# Patient Record
Sex: MALE | Race: BLACK OR AFRICAN AMERICAN | NOT HISPANIC OR LATINO | Employment: OTHER | URBAN - METROPOLITAN AREA
[De-identification: names, ages, dates, MRNs, and addresses within clinical notes are randomized per-mention and may not be internally consistent; named-entity substitution may affect disease eponyms.]

---

## 2017-05-26 PROBLEM — I10 ESSENTIAL HYPERTENSION: Status: ACTIVE | Noted: 2017-05-26

## 2017-07-19 PROBLEM — K02.9 DENTAL CARIES: Status: ACTIVE | Noted: 2017-07-19

## 2018-02-11 ENCOUNTER — HOSPITAL ENCOUNTER (EMERGENCY)
Facility: HOSPITAL | Age: 43
Discharge: HOME | End: 2018-02-12
Attending: EMERGENCY MEDICINE
Payer: MEDICAID

## 2018-02-11 DIAGNOSIS — R10.10 PAIN OF UPPER ABDOMEN: ICD-10-CM

## 2018-02-11 DIAGNOSIS — K29.70 GASTRITIS WITHOUT BLEEDING, UNSPECIFIED CHRONICITY, UNSPECIFIED GASTRITIS TYPE: Primary | ICD-10-CM

## 2018-02-11 PROCEDURE — 81003 URINALYSIS AUTO W/O SCOPE: CPT

## 2018-02-11 PROCEDURE — 99284 EMERGENCY DEPT VISIT MOD MDM: CPT | Mod: 25

## 2018-02-11 PROCEDURE — 85025 COMPLETE CBC W/AUTO DIFF WBC: CPT

## 2018-02-11 PROCEDURE — 80053 COMPREHEN METABOLIC PANEL: CPT

## 2018-02-11 RX ORDER — FAMOTIDINE 10 MG/ML
20 INJECTION INTRAVENOUS
Status: COMPLETED | OUTPATIENT
Start: 2018-02-12 | End: 2018-02-12

## 2018-02-11 RX ORDER — FAMOTIDINE 10 MG/ML
20 INJECTION INTRAVENOUS 2 TIMES DAILY
Status: DISCONTINUED | OUTPATIENT
Start: 2018-02-12 | End: 2018-02-11

## 2018-02-11 RX ORDER — ONDANSETRON 2 MG/ML
4 INJECTION INTRAMUSCULAR; INTRAVENOUS
Status: COMPLETED | OUTPATIENT
Start: 2018-02-12 | End: 2018-02-12

## 2018-02-12 VITALS
HEIGHT: 73 IN | TEMPERATURE: 99 F | RESPIRATION RATE: 20 BRPM | WEIGHT: 315 LBS | BODY MASS INDEX: 41.75 KG/M2 | HEART RATE: 84 BPM | OXYGEN SATURATION: 100 % | SYSTOLIC BLOOD PRESSURE: 142 MMHG | DIASTOLIC BLOOD PRESSURE: 86 MMHG

## 2018-02-12 LAB
ALBUMIN SERPL BCP-MCNC: 3.6 G/DL
ALP SERPL-CCNC: 74 U/L
ALT SERPL W/O P-5'-P-CCNC: 14 U/L
ANION GAP SERPL CALC-SCNC: 12 MMOL/L
AST SERPL-CCNC: 18 U/L
BASOPHILS # BLD AUTO: 0.01 K/UL
BASOPHILS NFR BLD: 0.1 %
BILIRUB SERPL-MCNC: 1.1 MG/DL
BILIRUB UR QL STRIP: NEGATIVE
BUN SERPL-MCNC: 8 MG/DL
CALCIUM SERPL-MCNC: 9.5 MG/DL
CHLORIDE SERPL-SCNC: 104 MMOL/L
CLARITY UR: CLEAR
CO2 SERPL-SCNC: 23 MMOL/L
COLOR UR: YELLOW
CREAT SERPL-MCNC: 1.3 MG/DL
DIFFERENTIAL METHOD: ABNORMAL
EOSINOPHIL # BLD AUTO: 0.2 K/UL
EOSINOPHIL NFR BLD: 2.4 %
ERYTHROCYTE [DISTWIDTH] IN BLOOD BY AUTOMATED COUNT: 14 %
EST. GFR  (AFRICAN AMERICAN): >60 ML/MIN/1.73 M^2
EST. GFR  (NON AFRICAN AMERICAN): >60 ML/MIN/1.73 M^2
GLUCOSE SERPL-MCNC: 121 MG/DL
GLUCOSE UR QL STRIP: NEGATIVE
HCT VFR BLD AUTO: 45.8 %
HGB BLD-MCNC: 15 G/DL
HGB UR QL STRIP: NEGATIVE
KETONES UR QL STRIP: NEGATIVE
LEUKOCYTE ESTERASE UR QL STRIP: NEGATIVE
LYMPHOCYTES # BLD AUTO: 2.4 K/UL
LYMPHOCYTES NFR BLD: 27.9 %
MCH RBC QN AUTO: 29.5 PG
MCHC RBC AUTO-ENTMCNC: 32.8 G/DL
MCV RBC AUTO: 90 FL
MONOCYTES # BLD AUTO: 1.1 K/UL
MONOCYTES NFR BLD: 13.3 %
NEUTROPHILS # BLD AUTO: 4.7 K/UL
NEUTROPHILS NFR BLD: 56.1 %
NITRITE UR QL STRIP: NEGATIVE
PH UR STRIP: 6 [PH] (ref 5–8)
PLATELET # BLD AUTO: 218 K/UL
PMV BLD AUTO: 11.2 FL
POTASSIUM SERPL-SCNC: 3.7 MMOL/L
PROT SERPL-MCNC: 7.3 G/DL
PROT UR QL STRIP: NEGATIVE
RBC # BLD AUTO: 5.08 M/UL
SODIUM SERPL-SCNC: 139 MMOL/L
SP GR UR STRIP: <=1.005 (ref 1–1.03)
URN SPEC COLLECT METH UR: ABNORMAL
UROBILINOGEN UR STRIP-ACNC: NEGATIVE EU/DL
WBC # BLD AUTO: 8.43 K/UL

## 2018-02-12 PROCEDURE — S0028 INJECTION, FAMOTIDINE, 20 MG: HCPCS | Performed by: EMERGENCY MEDICINE

## 2018-02-12 PROCEDURE — 25500020 PHARM REV CODE 255: Performed by: EMERGENCY MEDICINE

## 2018-02-12 PROCEDURE — 96361 HYDRATE IV INFUSION ADD-ON: CPT

## 2018-02-12 PROCEDURE — 96375 TX/PRO/DX INJ NEW DRUG ADDON: CPT

## 2018-02-12 PROCEDURE — 25000003 PHARM REV CODE 250: Performed by: EMERGENCY MEDICINE

## 2018-02-12 PROCEDURE — 63600175 PHARM REV CODE 636 W HCPCS: Performed by: EMERGENCY MEDICINE

## 2018-02-12 PROCEDURE — 96374 THER/PROPH/DIAG INJ IV PUSH: CPT

## 2018-02-12 RX ORDER — FENTANYL CITRATE 50 UG/ML
50 INJECTION, SOLUTION INTRAMUSCULAR; INTRAVENOUS
Status: COMPLETED | OUTPATIENT
Start: 2018-02-12 | End: 2018-02-12

## 2018-02-12 RX ORDER — KETOROLAC TROMETHAMINE 30 MG/ML
15 INJECTION, SOLUTION INTRAMUSCULAR; INTRAVENOUS
Status: COMPLETED | OUTPATIENT
Start: 2018-02-12 | End: 2018-02-12

## 2018-02-12 RX ADMIN — SODIUM CHLORIDE 1000 ML: 0.9 INJECTION, SOLUTION INTRAVENOUS at 12:02

## 2018-02-12 RX ADMIN — FAMOTIDINE 20 MG: 10 INJECTION, SOLUTION INTRAVENOUS at 12:02

## 2018-02-12 RX ADMIN — IOHEXOL 100 ML: 350 INJECTION, SOLUTION INTRAVENOUS at 02:02

## 2018-02-12 RX ADMIN — ONDANSETRON 4 MG: 2 INJECTION INTRAMUSCULAR; INTRAVENOUS at 12:02

## 2018-02-12 RX ADMIN — KETOROLAC TROMETHAMINE 15 MG: 30 INJECTION, SOLUTION INTRAMUSCULAR at 07:02

## 2018-02-12 RX ADMIN — FENTANYL CITRATE 50 MCG: 50 INJECTION INTRAMUSCULAR; INTRAVENOUS at 02:02

## 2018-02-12 RX ADMIN — LIDOCAINE HYDROCHLORIDE: 20 SOLUTION ORAL; TOPICAL at 02:02

## 2018-02-12 NOTE — ED PROVIDER NOTES
Encounter Date: 2/11/2018    SCRIBE #1 NOTE: I, Jag Malin, am scribing for, and in the presence of,  Dr. Lundy . I have scribed the entire note.       History     Chief Complaint   Patient presents with    Abdominal Pain     Patient presents to the ED with reports of having abdominal pain with nausea, vomiting, and diarrhea that started yesterday.      Time patient was seen by the provider: 11:42 PM      The patient is a 42 y.o. male with co-morbidities including: Dm, HLD, HTN who presents to the ED with a complaint of` abdominal pain started last night.  He reports the pain has gradually worsened through the day with acute exacerbation with eating. He reports associated vomiting, clear color. Pt reports excessive urination yesterday, and sore throat this evening. He reports he was short of breath but this has improved. He denies rashes, bruising, or any other complaints.    The patient has bee drinking a lot in the past few days. He is a rare smoker, took percocet for pain, no street drugs.              Review of patient's allergies indicates:   Allergen Reactions    Tramadol      Past Medical History:   Diagnosis Date    Diverticulitis     Elevated cholesterol     Hypertension      Past Surgical History:   Procedure Laterality Date    APPENDECTOMY      KNEE SURGERY Left      Family History   Problem Relation Age of Onset    Heart disease Mother     Diabetes Mother     Cancer Father      Social History   Substance Use Topics    Smoking status: Current Every Day Smoker     Packs/day: 0.30     Years: 22.00     Types: Cigarettes     Start date: 5/26/1995    Smokeless tobacco: Never Used    Alcohol use 0.0 oz/week      Comment: daily 6 to 12 pack beer  and vodka     Review of Systems   Constitutional: Negative for fever.   HENT: Positive for rhinorrhea and sore throat.    Respiratory: Negative for shortness of breath.    Cardiovascular: Negative for chest pain.   Gastrointestinal: Positive for abdominal  pain, diarrhea, nausea and vomiting. Negative for blood in stool.   Genitourinary: Negative for dysuria.   Musculoskeletal: Negative for back pain.   Skin: Negative for rash.   Neurological: Negative for weakness.   Hematological: Does not bruise/bleed easily.       Physical Exam     Initial Vitals [02/11/18 2327]   BP Pulse Resp Temp SpO2   (!) 170/101 95 18 98.5 °F (36.9 °C) 96 %      MAP       124         Physical Exam    Nursing note and vitals reviewed.  Constitutional: He appears well-developed and well-nourished.   HENT:   Head: Normocephalic and atraumatic.   Eyes: Conjunctivae are normal.   PERRLA, EOMI, conjunctiva are pink and moist, anicteric sclera     Neck: Neck supple.   No anterior or posterior cervical adenopathy, no midline cervical or paracervical tenderness, normal range of motion,      Cardiovascular: Normal rate, regular rhythm, normal heart sounds and intact distal pulses. Exam reveals no gallop and no friction rub.    No murmur heard.  Pulmonary/Chest: Breath sounds normal. He has no wheezes. He has no rhonchi. He has no rales.   No prolonged expiratory phase     Abdominal: Soft. He exhibits no distension. There is no tenderness.   Musculoskeletal: Normal range of motion.   Radial pulses are 2+ with normal ROM of upper extremities,  strength equal and intact    DP pulses are 1+ and intact bilaterally, legs with normal range of motion, there is no calf tenderness or lower leg swelling,     No T or L spine tenderness, no CVA tenderness   Neurological: He is alert and oriented to person, place, and time.   Skin: No rash noted. No erythema.   Psychiatric: He has a normal mood and affect.         ED Course   Procedures  Labs Reviewed   CBC W/ AUTO DIFFERENTIAL - Abnormal; Notable for the following:        Result Value    Mono # 1.1 (*)     All other components within normal limits   COMPREHENSIVE METABOLIC PANEL - Abnormal; Notable for the following:     Glucose 121 (*)     Total Bilirubin 1.1  (*)     All other components within normal limits   URINALYSIS - Abnormal; Notable for the following:     Specific Gravity, UA <=1.005 (*)     All other components within normal limits             Medical Decision Making:   Initial Assessment:   42 y.o.male presents to the ED with a complaint of abdominal pain, diarrhea, and vomiting.  Differential Diagnosis:   Gastroenteritis, viral syndrome, PUD, esophagitis,  Clinical Tests:   Lab Tests: Ordered and Reviewed  The following lab test(s) were unremarkable: CBC and CMP       <> Summary of Lab: T-bili - 1.1  Radiological Study: Ordered and Reviewed  ED Management:  2:01 AM reassessment -  Pt reports persisting pain, we discussed his lab results. Will give fentanyl and GI cocktail.-   4:05 AM - CT showing diverticulosis but no diverticulitis.  6:32 AM - Pt ios feeling somewhat improved. We discussed his alcohol use and how this contributed to his gastritis. He will be discharged home encouraged to take in clear fluids and avoid alcohol for the next few days.                    ED Course      Clinical Impression:     1. Pain of upper abdomen    2. Gastritis without bleeding, unspecified chronicity, unspecified gastritis type          Disposition:   Disposition: Discharged  Condition: Stable       I, Dr. Victor Manuel Lnudy, personally performed the services described in this documentation. All medical record entries made by the scribe were at my direction and in my presence.  I have reviewed the chart and agree that the record reflects my personal performance and is accurate and complete. Victor Manuel Lundy MD.  6:36 AM 02/12/2018                   Victor Manuel Lundy MD  02/12/18 0637       Victor Manuel Lundy MD  02/12/18 0715

## 2018-05-23 PROBLEM — R05.9 COUGH: Status: ACTIVE | Noted: 2018-05-23

## 2018-05-23 PROBLEM — E78.00 ELEVATED CHOLESTEROL: Status: ACTIVE | Noted: 2018-05-23

## 2018-05-23 PROBLEM — G47.33 OSA (OBSTRUCTIVE SLEEP APNEA): Status: ACTIVE | Noted: 2018-05-23

## 2018-05-23 PROBLEM — I50.30 (HFPEF) HEART FAILURE WITH PRESERVED EJECTION FRACTION: Status: ACTIVE | Noted: 2018-05-23

## 2018-06-06 PROBLEM — J96.11 HYPOXEMIC RESPIRATORY FAILURE, CHRONIC: Status: ACTIVE | Noted: 2018-06-06

## 2018-06-06 PROBLEM — E66.01 OBESITY, MORBID, BMI 40.0-49.9: Status: ACTIVE | Noted: 2018-06-06

## 2018-06-06 PROBLEM — R06.09 DOE (DYSPNEA ON EXERTION): Status: ACTIVE | Noted: 2018-06-06

## 2018-10-24 ENCOUNTER — HOSPITAL ENCOUNTER (EMERGENCY)
Facility: HOSPITAL | Age: 43
Discharge: HOME | End: 2018-10-24
Attending: EMERGENCY MEDICINE
Payer: MEDICAID

## 2018-10-24 VITALS
BODY MASS INDEX: 47.99 KG/M2 | RESPIRATION RATE: 12 BRPM | WEIGHT: 315 LBS | TEMPERATURE: 99 F | SYSTOLIC BLOOD PRESSURE: 126 MMHG | DIASTOLIC BLOOD PRESSURE: 83 MMHG | OXYGEN SATURATION: 95 % | HEART RATE: 97 BPM

## 2018-10-24 DIAGNOSIS — R07.9 CHEST PAIN: ICD-10-CM

## 2018-10-24 DIAGNOSIS — R05.9 COUGH: ICD-10-CM

## 2018-10-24 DIAGNOSIS — J06.9 URI, ACUTE: Primary | ICD-10-CM

## 2018-10-24 PROCEDURE — 93010 ELECTROCARDIOGRAM REPORT: CPT | Mod: ,,, | Performed by: INTERNAL MEDICINE

## 2018-10-24 PROCEDURE — 99284 EMERGENCY DEPT VISIT MOD MDM: CPT

## 2018-10-24 RX ORDER — NAPROXEN 375 MG/1
375 TABLET ORAL 2 TIMES DAILY WITH MEALS
Qty: 20 TABLET | Refills: 0 | Status: SHIPPED | OUTPATIENT
Start: 2018-10-24 | End: 2018-11-09

## 2018-10-24 RX ORDER — PREDNISONE 50 MG/1
50 TABLET ORAL DAILY
Qty: 5 TABLET | Refills: 0 | Status: SHIPPED | OUTPATIENT
Start: 2018-10-24 | End: 2018-10-29

## 2018-10-24 RX ORDER — PROMETHAZINE HYDROCHLORIDE AND DEXTROMETHORPHAN HYDROBROMIDE 6.25; 15 MG/5ML; MG/5ML
SYRUP ORAL
Qty: 180 ML | Refills: 0 | Status: SHIPPED | OUTPATIENT
Start: 2018-10-24 | End: 2018-11-09 | Stop reason: SDUPTHER

## 2018-10-24 NOTE — ED PROVIDER NOTES
Encounter Date: 10/24/2018       History     Chief Complaint   Patient presents with    Chest Congestion     x 2 days, productive cough, chest pain with cough and deep breaths     43 year old male with complaint of cough and congestion X 2 days.  Also reports sore throat.  Reports pain on left side of chest with deep breathing. No fever but reports chills.  No vomiting. No SOB.  Moderate congestion.  No alleviating factors.           Review of patient's allergies indicates:   Allergen Reactions    Tramadol      Past Medical History:   Diagnosis Date    Diverticulitis     Elevated cholesterol     Hypertension      Past Surgical History:   Procedure Laterality Date    APPENDECTOMY      KNEE SURGERY Left      Family History   Problem Relation Age of Onset    Heart disease Mother     Diabetes Mother     Cancer Father      Social History     Tobacco Use    Smoking status: Current Every Day Smoker     Packs/day: 0.50     Years: 22.00     Pack years: 11.00     Types: Cigarettes     Start date: 5/26/1995    Smokeless tobacco: Never Used    Tobacco comment: trying to quit   Substance Use Topics    Alcohol use: Yes     Alcohol/week: 0.0 oz     Comment: daily 6 to 12 pack beer  and vodka    Drug use: No     Review of Systems   Constitutional: Negative for fever.   HENT: Positive for congestion. Negative for sore throat.    Respiratory: Positive for cough. Negative for shortness of breath.    Cardiovascular: Negative for chest pain.   Gastrointestinal: Negative for nausea.   Genitourinary: Negative for dysuria.   Musculoskeletal: Negative for back pain.   Skin: Negative for rash.   Neurological: Negative for weakness.   Hematological: Does not bruise/bleed easily.       Physical Exam     Initial Vitals [10/24/18 1025]   BP Pulse Resp Temp SpO2   126/83 97 12 98.7 °F (37.1 °C) 95 %      MAP       --         Physical Exam    Nursing note and vitals reviewed.  Constitutional: He appears well-developed and  well-nourished.   HENT:   Head: Normocephalic and atraumatic.   Mouth/Throat: No oropharyngeal exudate.   + nasal congestioin    Eyes: Conjunctivae are normal. Pupils are equal, round, and reactive to light.   Neck: Normal range of motion. Neck supple.   Cardiovascular: Normal rate, regular rhythm, normal heart sounds and intact distal pulses.   Pulmonary/Chest: Breath sounds normal. No respiratory distress. He has no wheezes. He has no rhonchi. He has no rales.   Abdominal: Soft. There is no rebound and no guarding.   Musculoskeletal: Normal range of motion.   Neurological: He is alert.   Skin: Skin is warm and dry.   Psychiatric: He has a normal mood and affect. His behavior is normal. Thought content normal.         ED Course   Procedures  Labs Reviewed - No data to display  EKG Readings: (Independently Interpreted)   Other EKG Interpretations: EKG: NSR with rate of 97, no st or t wave abnormalities       Imaging Results          X-Ray Chest PA And Lateral (Final result)  Result time 10/24/18 10:43:05    Final result by Darwin Preciado MD (10/24/18 10:43:05)                 Impression:      No acute findings.      Electronically signed by: Darwin Preciado MD  Date:    10/24/2018  Time:    10:43             Narrative:    EXAMINATION:  XR CHEST PA AND LATERAL    CLINICAL HISTORY  Cough and congestion,    COMPARISON:  05/11/2018    FINDINGS:  The heart size is normal.  The lung fields are clear.  No acute cardiopulmonary infiltrative.                                     Imaging Results          X-Ray Chest PA And Lateral (Final result)  Result time 10/24/18 10:43:05    Final result by Darwin Preciado MD (10/24/18 10:43:05)                 Impression:      No acute findings.      Electronically signed by: Darwin Preciado MD  Date:    10/24/2018  Time:    10:43             Narrative:    EXAMINATION:  XR CHEST PA AND LATERAL    CLINICAL HISTORY  Cough and  congestion,    COMPARISON:  05/11/2018    FINDINGS:  The heart size is normal.  The lung fields are clear.  No acute cardiopulmonary infiltrative.                                                  Clinical Impression:   The primary encounter diagnosis was URI, acute. Diagnoses of Chest pain and Cough were also pertinent to this visit.                             Stefano Cody NP  10/24/18 1116       Stefano Cody NP  10/24/18 1116

## 2018-10-24 NOTE — ED NOTES
Bed: [facility] 03  Expected date:   Expected time:   Means of arrival:   Comments:     Dante San RN  10/24/18 1032

## 2019-04-11 PROBLEM — M54.50 CHRONIC BILATERAL LOW BACK PAIN WITHOUT SCIATICA: Status: ACTIVE | Noted: 2019-04-11

## 2019-04-11 PROBLEM — J44.9 STAGE 3 SEVERE COPD BY GOLD CLASSIFICATION: Status: ACTIVE | Noted: 2019-04-11

## 2019-04-11 PROBLEM — I51.7 LVH (LEFT VENTRICULAR HYPERTROPHY): Status: ACTIVE | Noted: 2019-04-11

## 2019-04-11 PROBLEM — G89.29 CHRONIC BILATERAL LOW BACK PAIN WITHOUT SCIATICA: Status: ACTIVE | Noted: 2019-04-11

## 2019-04-11 PROBLEM — Z96.659 HISTORY OF PARTIAL KNEE REPLACEMENT: Status: ACTIVE | Noted: 2019-04-11

## 2019-04-11 PROBLEM — Z72.0 TOBACCO ABUSE: Status: ACTIVE | Noted: 2019-04-11

## 2019-04-11 PROBLEM — R73.01 IFG (IMPAIRED FASTING GLUCOSE): Status: ACTIVE | Noted: 2019-04-11

## 2019-04-11 PROBLEM — Z99.81 ON HOME O2: Status: ACTIVE | Noted: 2019-04-11

## 2019-04-11 PROBLEM — M25.561 RIGHT KNEE PAIN: Status: ACTIVE | Noted: 2019-04-11

## 2020-10-17 PROBLEM — A41.9 SEVERE SEPSIS: Status: ACTIVE | Noted: 2020-10-17

## 2020-10-17 PROBLEM — R65.20 SEVERE SEPSIS: Status: ACTIVE | Noted: 2020-10-17

## 2020-10-18 PROBLEM — D63.8 ANEMIA OF CHRONIC DISEASE: Status: ACTIVE | Noted: 2020-10-18

## 2020-10-18 PROBLEM — E87.1 HYPONATREMIA: Status: ACTIVE | Noted: 2020-10-18

## 2020-10-18 PROBLEM — D72.829 LEUKOCYTOSIS: Status: ACTIVE | Noted: 2020-10-18

## 2020-10-18 PROBLEM — E13.9 DIABETES 1.5, MANAGED AS TYPE 2: Status: ACTIVE | Noted: 2020-10-18

## 2020-10-18 PROBLEM — R79.89 ELEVATED LFTS: Status: ACTIVE | Noted: 2020-10-18

## 2020-10-18 PROBLEM — I82.A11 DVT OF AXILLARY VEIN, ACUTE RIGHT: Status: ACTIVE | Noted: 2020-10-18

## 2020-10-18 PROBLEM — N17.9 AKI (ACUTE KIDNEY INJURY): Status: ACTIVE | Noted: 2020-10-18

## 2020-10-18 PROBLEM — M89.8X9 METABOLIC BONE DISEASE: Status: ACTIVE | Noted: 2020-10-18

## 2020-10-18 PROBLEM — R65.21 SEPTIC SHOCK: Status: ACTIVE | Noted: 2020-10-18

## 2020-10-18 PROBLEM — A41.9 SEPTIC SHOCK: Status: ACTIVE | Noted: 2020-10-18

## 2020-10-20 PROBLEM — F19.10 SUBSTANCE ABUSE: Status: ACTIVE | Noted: 2020-10-20

## 2020-10-20 PROBLEM — E11.21 DIABETIC KIDNEY DISEASE: Status: ACTIVE | Noted: 2020-10-20

## 2020-10-23 PROBLEM — R63.8 INADEQUATE ORAL INTAKE: Status: ACTIVE | Noted: 2020-10-23

## 2021-02-08 PROBLEM — F11.10 HEROIN ABUSE: Status: ACTIVE | Noted: 2021-02-08

## 2021-06-29 PROBLEM — M00.9 SEPTIC ARTHRITIS: Status: ACTIVE | Noted: 2021-06-29

## 2021-06-29 PROBLEM — R94.31 QT PROLONGATION: Status: ACTIVE | Noted: 2021-06-29

## 2021-06-29 PROBLEM — E11.9 DIABETES: Status: ACTIVE | Noted: 2021-06-29

## 2021-06-30 PROBLEM — R52 PAIN: Status: ACTIVE | Noted: 2021-06-30

## 2021-06-30 PROBLEM — J44.9 STAGE 3 SEVERE COPD BY GOLD CLASSIFICATION: Status: RESOLVED | Noted: 2019-04-11 | Resolved: 2021-06-30

## 2021-07-01 PROBLEM — E66.01 OBESITY, MORBID, BMI 40.0-49.9: Status: RESOLVED | Noted: 2018-06-06 | Resolved: 2021-07-01

## 2021-07-01 PROBLEM — R94.31 QT PROLONGATION: Status: RESOLVED | Noted: 2021-06-29 | Resolved: 2021-07-01

## 2021-07-01 PROBLEM — R52 PAIN: Status: RESOLVED | Noted: 2021-06-30 | Resolved: 2021-07-01

## 2021-07-02 PROBLEM — R52 PAIN: Status: ACTIVE | Noted: 2021-07-02

## 2021-07-04 PROBLEM — R52 PAIN: Status: RESOLVED | Noted: 2021-07-02 | Resolved: 2021-07-04

## 2021-07-05 PROBLEM — G47.33 OSA (OBSTRUCTIVE SLEEP APNEA): Status: RESOLVED | Noted: 2018-05-23 | Resolved: 2021-07-05

## 2021-07-05 PROBLEM — R94.31 PROLONGED QT INTERVAL: Status: ACTIVE | Noted: 2021-07-05

## 2021-07-20 PROBLEM — A41.9 SEPSIS: Status: ACTIVE | Noted: 2021-07-20

## 2021-07-21 PROBLEM — R63.4 WEIGHT LOSS, UNINTENTIONAL: Status: ACTIVE | Noted: 2021-07-21

## 2021-07-22 PROBLEM — D64.9 NORMOCYTIC ANEMIA: Status: ACTIVE | Noted: 2021-07-22

## 2021-07-22 PROBLEM — I50.22 CHRONIC HFREF (HEART FAILURE WITH REDUCED EJECTION FRACTION): Status: ACTIVE | Noted: 2021-07-22

## 2021-07-22 PROBLEM — B40.7 DISSEMINATED BLASTOMYCOSIS: Status: ACTIVE | Noted: 2021-07-22

## 2021-07-22 PROBLEM — F19.951 SUBSTANCE-INDUCED PSYCHOTIC DISORDER WITH HALLUCINATIONS: Status: ACTIVE | Noted: 2021-07-22

## 2021-07-22 PROBLEM — E87.6 HYPOKALEMIA: Status: ACTIVE | Noted: 2021-07-22

## 2021-07-22 PROBLEM — Z79.899 POLYPHARMACY: Status: ACTIVE | Noted: 2021-07-22

## 2021-07-22 PROBLEM — F11.122: Status: ACTIVE | Noted: 2021-02-08

## 2021-07-26 PROBLEM — M86.9: Status: ACTIVE | Noted: 2021-07-26

## 2021-07-29 RX ORDER — OXYCODONE AND ACETAMINOPHEN 5; 325 MG/1; MG/1
1 TABLET ORAL EVERY 6 HOURS PRN
Qty: 25 TABLET | Refills: 0 | Status: SHIPPED | OUTPATIENT
Start: 2021-07-29